# Patient Record
Sex: MALE | URBAN - METROPOLITAN AREA
[De-identification: names, ages, dates, MRNs, and addresses within clinical notes are randomized per-mention and may not be internally consistent; named-entity substitution may affect disease eponyms.]

---

## 2017-07-13 ENCOUNTER — INPATIENT (INPATIENT)
Facility: HOSPITAL | Age: 47
LOS: 4 days | Discharge: HOME | DRG: 897 | End: 2017-07-18
Attending: STUDENT IN AN ORGANIZED HEALTH CARE EDUCATION/TRAINING PROGRAM | Admitting: STUDENT IN AN ORGANIZED HEALTH CARE EDUCATION/TRAINING PROGRAM
Payer: MEDICAID

## 2017-07-13 VITALS
TEMPERATURE: 98 F | OXYGEN SATURATION: 96 % | DIASTOLIC BLOOD PRESSURE: 98 MMHG | RESPIRATION RATE: 18 BRPM | SYSTOLIC BLOOD PRESSURE: 135 MMHG | HEART RATE: 102 BPM

## 2017-07-13 LAB
ALBUMIN SERPL ELPH-MCNC: 4.7 G/DL — SIGNIFICANT CHANGE UP (ref 3.3–5)
ALP SERPL-CCNC: 67 U/L — SIGNIFICANT CHANGE UP (ref 40–120)
ALT FLD-CCNC: 131 U/L — HIGH (ref 10–45)
ANION GAP SERPL CALC-SCNC: 19 MMOL/L — HIGH (ref 5–17)
AST SERPL-CCNC: 213 U/L — HIGH (ref 10–40)
BILIRUB SERPL-MCNC: 1 MG/DL — SIGNIFICANT CHANGE UP (ref 0.2–1.2)
BUN SERPL-MCNC: 6 MG/DL — LOW (ref 7–23)
CALCIUM SERPL-MCNC: 9.1 MG/DL — SIGNIFICANT CHANGE UP (ref 8.4–10.5)
CHLORIDE SERPL-SCNC: 96 MMOL/L — SIGNIFICANT CHANGE UP (ref 96–108)
CO2 SERPL-SCNC: 22 MMOL/L — SIGNIFICANT CHANGE UP (ref 22–31)
CREAT SERPL-MCNC: 0.6 MG/DL — SIGNIFICANT CHANGE UP (ref 0.5–1.3)
GLUCOSE SERPL-MCNC: 132 MG/DL — HIGH (ref 70–99)
HCT VFR BLD CALC: 41.6 % — SIGNIFICANT CHANGE UP (ref 39–50)
HGB BLD-MCNC: 14.7 G/DL — SIGNIFICANT CHANGE UP (ref 13–17)
MAGNESIUM SERPL-MCNC: 1.9 MG/DL — SIGNIFICANT CHANGE UP (ref 1.6–2.6)
MCHC RBC-ENTMCNC: 32 PG — SIGNIFICANT CHANGE UP (ref 27–34)
MCHC RBC-ENTMCNC: 35.3 G/DL — SIGNIFICANT CHANGE UP (ref 32–36)
MCV RBC AUTO: 90.6 FL — SIGNIFICANT CHANGE UP (ref 80–100)
PLATELET # BLD AUTO: 150 K/UL — SIGNIFICANT CHANGE UP (ref 150–400)
POTASSIUM SERPL-MCNC: 4.4 MMOL/L — SIGNIFICANT CHANGE UP (ref 3.5–5.3)
POTASSIUM SERPL-SCNC: 4.4 MMOL/L — SIGNIFICANT CHANGE UP (ref 3.5–5.3)
PROT SERPL-MCNC: 8 G/DL — SIGNIFICANT CHANGE UP (ref 6–8.3)
RBC # BLD: 4.59 M/UL — SIGNIFICANT CHANGE UP (ref 4.2–5.8)
RBC # FLD: 13.5 % — SIGNIFICANT CHANGE UP (ref 10.3–16.9)
SODIUM SERPL-SCNC: 137 MMOL/L — SIGNIFICANT CHANGE UP (ref 135–145)
WBC # BLD: 4.8 K/UL — SIGNIFICANT CHANGE UP (ref 3.8–10.5)
WBC # FLD AUTO: 4.8 K/UL — SIGNIFICANT CHANGE UP (ref 3.8–10.5)

## 2017-07-13 PROCEDURE — 93010 ELECTROCARDIOGRAM REPORT: CPT

## 2017-07-13 PROCEDURE — 99284 EMERGENCY DEPT VISIT MOD MDM: CPT | Mod: 25

## 2017-07-13 RX ORDER — SODIUM CHLORIDE 9 MG/ML
1000 INJECTION INTRAMUSCULAR; INTRAVENOUS; SUBCUTANEOUS ONCE
Qty: 0 | Refills: 0 | Status: COMPLETED | OUTPATIENT
Start: 2017-07-13 | End: 2017-07-13

## 2017-07-13 RX ORDER — SODIUM CHLORIDE 9 MG/ML
1000 INJECTION, SOLUTION INTRAVENOUS
Qty: 0 | Refills: 0 | Status: COMPLETED | OUTPATIENT
Start: 2017-07-13 | End: 2017-07-14

## 2017-07-13 RX ORDER — ONDANSETRON 8 MG/1
4 TABLET, FILM COATED ORAL ONCE
Qty: 0 | Refills: 0 | Status: COMPLETED | OUTPATIENT
Start: 2017-07-13 | End: 2017-07-13

## 2017-07-13 RX ADMIN — ONDANSETRON 4 MILLIGRAM(S): 8 TABLET, FILM COATED ORAL at 22:47

## 2017-07-13 RX ADMIN — SODIUM CHLORIDE 2000 MILLILITER(S): 9 INJECTION INTRAMUSCULAR; INTRAVENOUS; SUBCUTANEOUS at 22:47

## 2017-07-13 RX ADMIN — SODIUM CHLORIDE 2000 MILLILITER(S): 9 INJECTION INTRAMUSCULAR; INTRAVENOUS; SUBCUTANEOUS at 23:38

## 2017-07-13 RX ADMIN — Medication 2 MILLIGRAM(S): at 23:43

## 2017-07-13 RX ADMIN — Medication 50 MILLIGRAM(S): at 23:43

## 2017-07-13 NOTE — ED PROVIDER NOTE - PROGRESS NOTE DETAILS
IVF running, pt shakiness improved. Lipase negative, s/p 1L bolus. Ordered for another   EKG NSR 91 1L bous running, pt shakiness improved.

## 2017-07-13 NOTE — ED ADULT TRIAGE NOTE - CHIEF COMPLAINT QUOTE
Pt BIBA from work place s/p possible syncope (unwittnessed) or possible fall.  Per EMS, pt drinks daily and doesn't remember what happened but his coworkers just heard a loud bang.  Pt denies Dizziness, Pain, N/V/D, SOB, Fevers at this time.

## 2017-07-13 NOTE — ED PROVIDER NOTE - PHYSICAL EXAMINATION
Constitutional: slightly shaky, WDWN resting comfortably in bed; NAD  Head: NC/AT  Eyes: +scleral injection, +pterygium, PERRL, EOMI  ENT: dry MM  Neck: supple  Respiratory: CTA B/L; no W/R/R, no retractions  Cardiac: +S1/S2; tachycardic, regular rhythm, no M/R/G  Gastrointestinal: soft, nontender, nondistended, no rebound or guarding; +BSx4  Extremities: WWP, no clubbing or cyanosis; no peripheral edema  Musculoskeletal: NROM x4; no joint swelling, tenderness or erythema  Vascular: 2+ radial, femoral, DP/PT pulses B/L  Dermatologic: skin warm, dry and intact  Neurologic: AAOx3; CNII-XII grossly intact; no focal deficits  Psychiatric: affect and characteristics of appearance, verbalizations, behaviors are appropriate Constitutional: tremulous; NAD  Head: NC/AT  Eyes: +scleral injection, +pterygium, PERRL, EOMI  ENT: dry MM, +tongue fasciculations  Neck: supple  Respiratory: CTA B/L; no W/R/R, no retractions  Cardiac: +S1/S2; RRR, no M/R/G  Gastrointestinal: soft, nontender, nondistended, no rebound or guarding; +BSx4  Extremities: WWP, no clubbing or cyanosis; no peripheral edema  Musculoskeletal: NROM x4; no joint swelling, tenderness or erythema  Vascular: 2+ radial, femoral, DP/PT pulses B/L  Dermatologic: skin warm, dry and intact  Neurologic: AAOx3; CNII-XII grossly intact; no focal deficits  Psychiatric: affect and characteristics of appearance, verbalizations, behaviors are appropriate

## 2017-07-13 NOTE — ED PROVIDER NOTE - ATTENDING CONTRIBUTION TO CARE
patient EtOH dependent  shaking and nauseous  Last drink yesterday  Vitals noted  Exam as documented, tremulous, diaphoretic  Received IVF, banana bag, ativan  Admitted for withdrawl

## 2017-07-13 NOTE — ED PROVIDER NOTE - MEDICAL DECISION MAKING DETAILS
Patient presented with nausea and tremors, . Improved after IVF  Awaiting EKG and lipase.   Likely d/c after fluids and labs Patient presented with nausea and tremors, . Improved after IVF. Lipase negative  EKG NSR 91   Likely d/c after 2nd bolus Patient presented with nausea and tremors, -->91 after 1L NS, Lipase negative. Pt still tremulous, given Banana bag, IV Ativan 2mg and Librium 50mg.   Will admit to F under SVC for alcohol withdrawal.  EKG NSR 91

## 2017-07-13 NOTE — ED PROVIDER NOTE - OBJECTIVE STATEMENT
47 Irish speaking male with daily alcohol use presenting after not feeling well at work. Patient is a  at [employer]. 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 He reports his "stomach is bothering him" and that this happens sometimes after drinking. He denies LOC, HA, dizziness, palpitations, chest pain, diaphoresis, diarrhea, fevers/chills.

## 2017-07-14 ENCOUNTER — TRANSCRIPTION ENCOUNTER (OUTPATIENT)
Age: 47
End: 2017-07-14

## 2017-07-14 DIAGNOSIS — F10.239 ALCOHOL DEPENDENCE WITH WITHDRAWAL, UNSPECIFIED: ICD-10-CM

## 2017-07-14 DIAGNOSIS — R63.8 OTHER SYMPTOMS AND SIGNS CONCERNING FOOD AND FLUID INTAKE: ICD-10-CM

## 2017-07-14 DIAGNOSIS — R94.5 ABNORMAL RESULTS OF LIVER FUNCTION STUDIES: ICD-10-CM

## 2017-07-14 DIAGNOSIS — Z29.9 ENCOUNTER FOR PROPHYLACTIC MEASURES, UNSPECIFIED: ICD-10-CM

## 2017-07-14 LAB
ALBUMIN SERPL ELPH-MCNC: 4.1 G/DL — SIGNIFICANT CHANGE UP (ref 3.3–5)
ALP SERPL-CCNC: 60 U/L — SIGNIFICANT CHANGE UP (ref 40–120)
ALT FLD-CCNC: 103 U/L — HIGH (ref 10–45)
AMPHET UR-MCNC: NEGATIVE — SIGNIFICANT CHANGE UP
ANION GAP SERPL CALC-SCNC: 13 MMOL/L — SIGNIFICANT CHANGE UP (ref 5–17)
AST SERPL-CCNC: 143 U/L — HIGH (ref 10–40)
BARBITURATES UR SCN-MCNC: NEGATIVE — SIGNIFICANT CHANGE UP
BASOPHILS NFR BLD AUTO: 1 % — SIGNIFICANT CHANGE UP (ref 0–2)
BASOPHILS NFR BLD AUTO: 2.1 % — HIGH (ref 0–2)
BENZODIAZ UR-MCNC: NEGATIVE — SIGNIFICANT CHANGE UP
BILIRUB SERPL-MCNC: 1.5 MG/DL — HIGH (ref 0.2–1.2)
BUN SERPL-MCNC: 5 MG/DL — LOW (ref 7–23)
CALCIUM SERPL-MCNC: 8.6 MG/DL — SIGNIFICANT CHANGE UP (ref 8.4–10.5)
CHLORIDE SERPL-SCNC: 98 MMOL/L — SIGNIFICANT CHANGE UP (ref 96–108)
CO2 SERPL-SCNC: 25 MMOL/L — SIGNIFICANT CHANGE UP (ref 22–31)
COCAINE METAB.OTHER UR-MCNC: NEGATIVE — SIGNIFICANT CHANGE UP
CREAT SERPL-MCNC: 0.6 MG/DL — SIGNIFICANT CHANGE UP (ref 0.5–1.3)
EOSINOPHIL NFR BLD AUTO: 2.1 % — SIGNIFICANT CHANGE UP (ref 0–6)
EOSINOPHIL NFR BLD AUTO: 4 % — SIGNIFICANT CHANGE UP (ref 0–6)
ETHANOL SERPL-MCNC: <10 MG/DL — SIGNIFICANT CHANGE UP (ref 0–10)
GLUCOSE SERPL-MCNC: 103 MG/DL — HIGH (ref 70–99)
HCT VFR BLD CALC: 40 % — SIGNIFICANT CHANGE UP (ref 39–50)
HGB BLD-MCNC: 14 G/DL — SIGNIFICANT CHANGE UP (ref 13–17)
LYMPHOCYTES # BLD AUTO: 12 % — LOW (ref 13–44)
LYMPHOCYTES # BLD AUTO: 15.6 % — SIGNIFICANT CHANGE UP (ref 13–44)
MAGNESIUM SERPL-MCNC: 1.8 MG/DL — SIGNIFICANT CHANGE UP (ref 1.6–2.6)
MCHC RBC-ENTMCNC: 32 PG — SIGNIFICANT CHANGE UP (ref 27–34)
MCHC RBC-ENTMCNC: 35 G/DL — SIGNIFICANT CHANGE UP (ref 32–36)
MCV RBC AUTO: 91.3 FL — SIGNIFICANT CHANGE UP (ref 80–100)
METHADONE UR-MCNC: NEGATIVE — SIGNIFICANT CHANGE UP
MONOCYTES NFR BLD AUTO: 14.3 % — HIGH (ref 2–14)
MONOCYTES NFR BLD AUTO: 14.9 % — HIGH (ref 2–14)
NEUTROPHILS NFR BLD AUTO: 64.5 % — SIGNIFICANT CHANGE UP (ref 43–77)
NEUTROPHILS NFR BLD AUTO: 69.5 % — SIGNIFICANT CHANGE UP (ref 43–77)
OPIATES UR-MCNC: NEGATIVE — SIGNIFICANT CHANGE UP
PCP SPEC-MCNC: SIGNIFICANT CHANGE UP
PCP SPEC-MCNC: SIGNIFICANT CHANGE UP
PCP UR-MCNC: NEGATIVE — SIGNIFICANT CHANGE UP
PHOSPHATE SERPL-MCNC: 3 MG/DL — SIGNIFICANT CHANGE UP (ref 2.5–4.5)
PLATELET # BLD AUTO: 144 K/UL — LOW (ref 150–400)
POTASSIUM SERPL-MCNC: 3.4 MMOL/L — LOW (ref 3.5–5.3)
POTASSIUM SERPL-SCNC: 3.4 MMOL/L — LOW (ref 3.5–5.3)
PROT SERPL-MCNC: 7.1 G/DL — SIGNIFICANT CHANGE UP (ref 6–8.3)
RBC # BLD: 4.38 M/UL — SIGNIFICANT CHANGE UP (ref 4.2–5.8)
RBC # FLD: 13.6 % — SIGNIFICANT CHANGE UP (ref 10.3–16.9)
SODIUM SERPL-SCNC: 136 MMOL/L — SIGNIFICANT CHANGE UP (ref 135–145)
THC UR QL: NEGATIVE — SIGNIFICANT CHANGE UP
WBC # BLD: 7 K/UL — SIGNIFICANT CHANGE UP (ref 3.8–10.5)
WBC # FLD AUTO: 7 K/UL — SIGNIFICANT CHANGE UP (ref 3.8–10.5)

## 2017-07-14 PROCEDURE — 99223 1ST HOSP IP/OBS HIGH 75: CPT

## 2017-07-14 PROCEDURE — 95819 EEG AWAKE AND ASLEEP: CPT | Mod: 26

## 2017-07-14 RX ORDER — POTASSIUM CHLORIDE 20 MEQ
20 PACKET (EA) ORAL ONCE
Qty: 0 | Refills: 0 | Status: COMPLETED | OUTPATIENT
Start: 2017-07-14 | End: 2017-07-14

## 2017-07-14 RX ORDER — POTASSIUM CHLORIDE 20 MEQ
40 PACKET (EA) ORAL ONCE
Qty: 0 | Refills: 0 | Status: DISCONTINUED | OUTPATIENT
Start: 2017-07-14 | End: 2017-07-14

## 2017-07-14 RX ORDER — MAGNESIUM SULFATE 500 MG/ML
2 VIAL (ML) INJECTION ONCE
Qty: 0 | Refills: 0 | Status: DISCONTINUED | OUTPATIENT
Start: 2017-07-14 | End: 2017-07-14

## 2017-07-14 RX ORDER — HEPARIN SODIUM 5000 [USP'U]/ML
5000 INJECTION INTRAVENOUS; SUBCUTANEOUS EVERY 8 HOURS
Qty: 0 | Refills: 0 | Status: DISCONTINUED | OUTPATIENT
Start: 2017-07-14 | End: 2017-07-18

## 2017-07-14 RX ORDER — MAGNESIUM SULFATE 500 MG/ML
2 VIAL (ML) INJECTION ONCE
Qty: 0 | Refills: 0 | Status: COMPLETED | OUTPATIENT
Start: 2017-07-14 | End: 2017-07-14

## 2017-07-14 RX ORDER — FOLIC ACID 0.8 MG
1 TABLET ORAL DAILY
Qty: 0 | Refills: 0 | Status: DISCONTINUED | OUTPATIENT
Start: 2017-07-14 | End: 2017-07-18

## 2017-07-14 RX ORDER — HEPARIN SODIUM 5000 [USP'U]/ML
5000 INJECTION INTRAVENOUS; SUBCUTANEOUS EVERY 8 HOURS
Qty: 0 | Refills: 0 | Status: DISCONTINUED | OUTPATIENT
Start: 2017-07-14 | End: 2017-07-14

## 2017-07-14 RX ORDER — THIAMINE MONONITRATE (VIT B1) 100 MG
100 TABLET ORAL DAILY
Qty: 0 | Refills: 0 | Status: DISCONTINUED | OUTPATIENT
Start: 2017-07-14 | End: 2017-07-18

## 2017-07-14 RX ORDER — POTASSIUM CHLORIDE 20 MEQ
40 PACKET (EA) ORAL ONCE
Qty: 0 | Refills: 0 | Status: COMPLETED | OUTPATIENT
Start: 2017-07-14 | End: 2017-07-14

## 2017-07-14 RX ADMIN — Medication 50 MILLIGRAM(S): at 16:58

## 2017-07-14 RX ADMIN — Medication 40 MILLIEQUIVALENT(S): at 08:55

## 2017-07-14 RX ADMIN — Medication 1 MILLIGRAM(S): at 09:23

## 2017-07-14 RX ADMIN — HEPARIN SODIUM 5000 UNIT(S): 5000 INJECTION INTRAVENOUS; SUBCUTANEOUS at 22:09

## 2017-07-14 RX ADMIN — Medication 1 TABLET(S): at 11:17

## 2017-07-14 RX ADMIN — HEPARIN SODIUM 5000 UNIT(S): 5000 INJECTION INTRAVENOUS; SUBCUTANEOUS at 14:00

## 2017-07-14 RX ADMIN — Medication 100 MILLIGRAM(S): at 11:17

## 2017-07-14 RX ADMIN — Medication 50 MILLIGRAM(S): at 06:43

## 2017-07-14 RX ADMIN — SODIUM CHLORIDE 150 MILLILITER(S): 9 INJECTION, SOLUTION INTRAVENOUS at 00:12

## 2017-07-14 RX ADMIN — HEPARIN SODIUM 5000 UNIT(S): 5000 INJECTION INTRAVENOUS; SUBCUTANEOUS at 06:43

## 2017-07-14 RX ADMIN — Medication 50 MILLIGRAM(S): at 11:17

## 2017-07-14 RX ADMIN — Medication 20 MILLIEQUIVALENT(S): at 13:22

## 2017-07-14 RX ADMIN — Medication 1 MILLIGRAM(S): at 11:17

## 2017-07-14 RX ADMIN — Medication 50 MILLIGRAM(S): at 22:05

## 2017-07-14 RX ADMIN — Medication 50 GRAM(S): at 08:56

## 2017-07-14 NOTE — PROGRESS NOTE ADULT - PROBLEM SELECTOR PLAN 1
Patient drinks 15 beers/day, last drink 7/12 at 10pm. Pt likely had seizure prior to admission with LOC and tongue biting. Currently patient is tremulous, reports formication and is lethargic.  CIWA 10.   - Continue Librium 50mg Q6hrs.  - Continue Ativan 2mg Q4hrs prn.  - Continue multivitamins, thiamine, and folic acid.  - B12/folic acid levels  - CIWA Q4hrs. Patient drinks 15 beers/day, last drink 7/12 at 10pm. Pt had LOC and tongue bleeding. Currently patient is tremulous, reports formication and is lethargic.  CIWA 10.   - Continue Librium 50mg Q6hrs.  - Continue Ativan 1mg Q4hrs prn.  - Continue multivitamins, thiamine, and folic acid.  - B12/folic acid levels  - CIWA Q4hrs. Patient drinks 15 beers/day, last drink 7/12 at 10pm. Pt had LOC and tongue bleeding. Currently patient is tremulous, reports formication and is lethargic.  CIWA 10.   - Continue Librium 50mg Q6hrs.  - 2 mg Ativan PO now  - Continue Ativan 1mg Q4hrs prn CIWA score  - Continue multivitamins, thiamine, and folic acid.  - B12/folic acid levels  - CIWA Q4hrs. Patient drinks 15 beers/day, last drink 7/12 at 10pm. Pt had LOC and tongue bleeding, PT likely had seizure. Currently patient is tremulous, reports formication and is lethargic.  CIWA 10.   - Continue Librium 50mg Q6hrs.  - 2 mg Ativan PO now  - Continue Ativan 1mg Q4hrs prn CIWA score >10  - EEG to evaluate alcohol withdrawal seizures  - Continue multivitamins, thiamine, and folic acid  - B12/folic acid levels  - CIWA Q4hrs.

## 2017-07-14 NOTE — PROGRESS NOTE ADULT - PROBLEM SELECTOR PLAN 4
F: continue with banana bag running at 150cc/hr  E: replete lytes PRN for K < 4 and Mg < 2, repleted K and Mg today will re eval in AM  N: regular diet F: patient does not IVF at this time  E: replete lytes PRN for K < 4 and Mg < 2, repleted K and Mg today will re eval in AM  N: regular diet

## 2017-07-14 NOTE — DISCHARGE NOTE ADULT - HOSPITAL COURSE
Pt is a 47 year old male with 5 year history of alcohol abuse who presented to ER from work following possible seizure. Pt stated that he felt dizzy, had tremors and experienced loss of conscience. On arrival to the ER patient was noted to have tongue lacerations with blood in the mouth. Pt on presentation had a CIWA score of 9. He was nauseous, diaphoretic, anxious, tremulous. Pt was placed on librium with PRN ativan based on his CIWA score. Abdominal ultrasound showed.....Hepatitis panel showed.....Pt currently feels greatly improved relative to his admission and denies nausea, vomiting, formication, A/V/T hallucinations. Pt is a 47 year old male with 5 year history of alcohol abuse who presented to ER from work following possible seizure. Pt stated that he felt dizzy, had tremors and experienced loss of conscience. On arrival to the ER patient was noted to have tongue lacerations with blood in the mouth. Pt on presentation had a CIWA score of 9. He was nauseous, diaphoretic, anxious, tremulous. Pt was placed on librium with PRN ativan based on his CIWA score. Abdominal ultrasound showed hepatitic steatosis. Hepatitis panel unremarkable. When we attempted to taper his librium pt became delirious, was found wandering the hallways and became anxious. Pt is a 47 year old male with 5 year history of alcohol abuse who presented to ER from work following possible seizure. Pt stated that he felt dizzy, had tremors and experienced loss of conscience. On arrival to the ER patient was noted to have tongue lacerations with blood in the mouth. Pt on presentation had a CIWA score of 9. He was nauseous, diaphoretic, anxious, tremulous. Pt was placed on librium with PRN ativan based on his CIWA score. Abdominal ultrasound showed hepatitic steatosis. Hepatitis panel unremarkable. When we initially attempted to taper his librium pt became delirious, was found wandering the hallways and became anxious. At discharge patient was asymptomatic. Pt is a 47 year old male with 5 year history of alcohol abuse who presented to ER from work following possible seizure. Pt stated that he felt dizzy, had tremors and experienced loss of conscience. On arrival to the ER patient was noted to have tongue lacerations with blood in the mouth. Pt on presentation had a CIWA score of 9. He was nauseous, diaphoretic, anxious, tremulous. Pt was placed on librium with PRN ativan based on his CIWA score. Abdominal ultrasound showed hepatitic steatosis. Hepatitis panel unremarkable. When we initially attempted to taper his librium pt became delirious, was found wandering the hallways and became anxious but this was likely due to benzodiazepine delirium as opposed to alcohol withdrawal. At discharge patient was asymptomatic.

## 2017-07-14 NOTE — H&P ADULT - NSHPSOCIALHISTORY_GEN_ALL_CORE
Patient drinks 15 beers per day for the past 5 years. Used to smoke 1 pack of cigarettes per day since age 13 until 2010, and since then he has been smoking 1-2 cigarettes a day. Does not use recreational drugs.

## 2017-07-14 NOTE — H&P ADULT - PROBLEM SELECTOR PLAN 2
Patient with transaminitis, with AST > ALT. Likely due to EtOH abuse.  - Continue to trend.  - Abdominal US for RUQ eval ordered, f/u

## 2017-07-14 NOTE — H&P ADULT - HISTORY OF PRESENT ILLNESS
46yo poor historian, Sinhala speaking male with PMH of EtOH abuse with ?history of seizures, and no intubation, presents BIBEMS after having tremors at work ([employer]). Patient says he usually drinks 15 beers/day for the past 5 yrs, and was in his usual state of health when he started his work shift at 5pm, and at 6pm his coworkers noticed he was shaking and decided to call EMS. Patient states he never loss consciousness or had seizures before being admitted. Of note, patient very vague in his story, initially said he has never been admitted to any hospital and that he has only has been treated in the ED at [facility], but further in the conversation, patient said he was admitted at [facility] (although no hx of admission on chart review noted) and treated for 2-3 days as inpatient- at that time he reported he was having seizure. At this moment, patient denies any nausea, vomiting, changes in bowel pattern, headache, visual/auditory/tactile hallucinations, or anxiety/agitation. Last drink was 7/12 at 10pm.     In the ED, patient's vital signs were:    BP: 135/98mmHg  HR: 102  RR: 18   O2Sat: 96% on RA   Temp: 97.5    Patient's labs were unremarkable except for elevated LFT's (AST > ALT).    In the ED, he also received librium 50mg x1, ativan 2mg x1, zofran 4mg x1, 1 banana bag, and 1L of NS x2.

## 2017-07-14 NOTE — DISCHARGE NOTE ADULT - PATIENT PORTAL LINK FT
“You can access the Follow[facility] Patient Portal, offered by [facility], by registering with the following website: [email or web address]”

## 2017-07-14 NOTE — PROGRESS NOTE ADULT - ASSESSMENT
48yo poor historian, Lithuanian speaking male with PMH of EtOH abuse - currently drinking approx 10 drinks/day - with a history of seizures who presented to the ER after probable seizure at work. Patient had LOC, tongue biting and had resting tremors at work. Currently patient remains lethargic, has tremors of the extremities and fasciculations of the tongue. Pt reports formication. 48yo poor historian, Hungarian speaking male with PMH of EtOH abuse - currently drinking approx 10 drinks/day - with a history of seizures who presented to the ER after probable seizure at work. Patient had LOC, tongue biting and had resting tremors at work. Currently patient remains lethargic, has tremors of the extremities and fasciculations of the tongue. Pt reports formication. CIWA 10.

## 2017-07-14 NOTE — H&P ADULT - ATTENDING COMMENTS
Pt seen and examined by me at bedside earlier in AM. Agree with housestaff's exam/a/p as noted.  [id number] used, report hx of etoh w/d seizure, last 6month ago. last drink on wed around 11pm, usually drinks 5-20 beer per day. VSS, +tremulous, R tip of tongue laceration 2/2 ? seizure.   labs reviewed.   a/p:  1. ETOH w/d: c/w librium 50mg q6hrs, frequent CIWA; ativan prn, EEG to r/o seizure.   2. Abnormal LFT likely 2/2 ETOH abuse: check hepatitis panel; check RUQ u/s  3. Thrombocytopenia likely 2/2 ETOH  rest of a/p as above  Dispo: anticipate dc in 1-2 days.

## 2017-07-14 NOTE — H&P ADULT - ASSESSMENT
48yo poor historian, Turkmen speaking male with PMH of EtOH abuse with ?history of seizures, and no intubation, presents BIBEMS after having tremors at work admitted for alcohol withdrawal.

## 2017-07-14 NOTE — H&P ADULT - NSHPLABSRESULTS_GEN_ALL_CORE
LABS:                         14.7   4.8   )-----------( 150      ( 13 Jul 2017 22:56 )             41.6     07-13    137  |  96  |  6<L>  ----------------------------<  132<H>  4.4   |  22  |  0.60    Ca    9.1      13 Jul 2017 22:56  Mg     1.9     07-13    TPro  8.0  /  Alb  4.7  /  TBili  1.0  /  DBili  x   /  AST  213<H>  /  ALT  131<H>  /  AlkPhos  67  07-13    RADIOLOGY, EKG & ADDITIONAL TESTS: Reviewed.

## 2017-07-14 NOTE — PROGRESS NOTE ADULT - PROBLEM SELECTOR PLAN 2
Patient with transaminitis, with AST > ALT. Likely due to EtOH abuse.  - Continue to trend.  - Abdominal US for RUQ eval ordered, f/u Patient with transaminitis, with AST > ALT. Likely due to EtOH abuse.  - Continue to trend.  - hepatitis panels to assess for co morbid hepatitis  - Abdominal US for RUQ eval ordered, f/u

## 2017-07-14 NOTE — DISCHARGE NOTE ADULT - MEDICATION SUMMARY - MEDICATIONS TO TAKE
I will START or STAY ON the medications listed below when I get home from the hospital:    Multiple Vitamins oral tablet  -- 1 tab(s) by mouth once a day  -- Indication: For Nutrition, metabolism, and development symptoms    thiamine 100 mg oral tablet  -- 1 tab(s) by mouth once a day  -- Indication: For Nutrition, metabolism, and development symptoms    folic acid 1 mg oral tablet  -- 1 tab(s) by mouth once a day  -- Indication: For Nutrition, metabolism, and development symptoms

## 2017-07-14 NOTE — H&P ADULT - PROBLEM SELECTOR PLAN 1
Patient drinks 15 beers/day, last drink 7/12 at 10pm. Possible history of seizures, no intubation. VSS. CIWA of 9 on admission for tremors and diaphoresis.   - Continue Librium 50mg Q6hrs.  - Continue Ativan 2mg Q4hrs prn.  - Continue multivitamins, thiamine, and folic acid.  - CIWA Q4hrs.

## 2017-07-14 NOTE — DISCHARGE NOTE ADULT - CARE PLAN
Principal Discharge DX:	Alcohol withdrawal syndrome, with unspecified complication  Goal:	Decrease drinking  Instructions for follow-up, activity and diet:	We determined that you were likely brought to the hospital because you drink too much alcohol too frequently. You should cut back on your drinking. You should schedule an appointment with a doctor to discuss your drinking and strategies to cut back on your drinking.  Secondary Diagnosis:	Elevated LFTs  Instructions for follow-up, activity and diet:	You were found to have elevated enzymes which indicate your liver was damaged. This could be due to your alcohol use or may be a symptom of another medical problem. Please follow up with a doctor to determine if this issue has resolved and the cause.  Secondary Diagnosis:	Nutrition, metabolism, and development symptoms  Instructions for follow-up, activity and diet:	You were found to be deficient in a number of vitamins most likely due to your poor diet and alcohol use. Please eat a balanced diet and cut back on your drinking. Principal Discharge DX:	Alcohol withdrawal syndrome, with unspecified complication  Goal:	Decrease drinking  Instructions for follow-up, activity and diet:	We determined that you were likely brought to the hospital because you drink too much alcohol too frequently. You should cut back on your drinking. You should schedule an appointment with a doctor to discuss your drinking and strategies to cut back on your drinking.  Secondary Diagnosis:	Elevated LFTs  Instructions for follow-up, activity and diet:	You were found to have elevated enzymes which indicate your liver was damaged. We performed an ultrasound of your liver while you were in the hospital and it is likely that your liver damage is due to your drinking. You should reduce the amount you drink in order to help fix the damage to your liver.  Secondary Diagnosis:	Nutrition, metabolism, and development symptoms  Instructions for follow-up, activity and diet:	You were found to be deficient in a number of vitamins most likely due to your poor diet and alcohol use. Please eat a balanced diet and cut back on your drinking. Principal Discharge DX:	Alcohol withdrawal syndrome, with unspecified complication  Goal:	Decrease drinking  Instructions for follow-up, activity and diet:	We determined that you were likely brought to the hospital because you drink too much alcohol too frequently. You should cut back on your drinking. You should schedule an appointment with a doctor to discuss your drinking and strategies to cut back on your drinking.  Secondary Diagnosis:	Elevated LFTs  Instructions for follow-up, activity and diet:	You were found to have elevated enzymes which indicate your liver was damaged. We performed an ultrasound of your liver while you were in the hospital and it is likely that your liver damage is due to your drinking. You should reduce the amount you drink in order to help fix the damage to your liver. Please follow up with your primary doctor to follow your liver damage.  Secondary Diagnosis:	Nutrition, metabolism, and development symptoms  Instructions for follow-up, activity and diet:	You were found to be deficient in a number of vitamins most likely due to your poor diet and alcohol use. Please eat a balanced diet and cut back on your drinking. Take the vitamins that we have ordered for you as ordered. Please follow up with your primary care doctor.

## 2017-07-14 NOTE — H&P ADULT - NSHPPHYSICALEXAM_GEN_ALL_CORE
VITAL SIGNS:  T(C): 36.8 (07-14-17 @ 01:23), Max: 37.2 (07-13-17 @ 23:53)  T(F): 98.3 (07-14-17 @ 01:23), Max: 99 (07-13-17 @ 23:53)  HR: 83 (07-14-17 @ 01:23) (83 - 102)  BP: 146/91 (07-14-17 @ 01:23) (135/98 - 146/91)  BP(mean): --  RR: 18 (07-14-17 @ 01:23) (18 - 18)  SpO2: 97% (07-14-17 @ 01:23) (96% - 97%)  Wt(kg): --    PHYSICAL EXAM:    Constitutional: patient looks anxious, however, in no acute distress.  Head: NC/AT  Eyes: EOMI  ENT: MMM, streaks of blood noticed in mouth, however no gross trauma noticed  Respiratory: CTA bilaterally  Cardiac: RRR, no M/R/G appreciated  Gastrointestinal: normoactive bowel sounds, abdomen soft, NTND  Extremities: WWP, no peripheral edema  Musculoskeletal: no joint swelling  Vascular: 2+ radial, and DP pulses B/L  Neurologic: AAOx3, no focal deficits

## 2017-07-14 NOTE — H&P ADULT - PROBLEM SELECTOR PLAN 4
F: continue with banana bag running at 150cc/hr  E: replete lytes PRN for K < 4 and Mg < 2  N: regular diet

## 2017-07-14 NOTE — DISCHARGE NOTE ADULT - PLAN OF CARE
Decrease drinking We determined that you were likely brought to the hospital because you drink too much alcohol too frequently. You should cut back on your drinking. You should schedule an appointment with a doctor to discuss your drinking and strategies to cut back on your drinking. You were found to have elevated enzymes which indicate your liver was damaged. This could be due to your alcohol use or may be a symptom of another medical problem. Please follow up with a doctor to determine if this issue has resolved and the cause. You were found to be deficient in a number of vitamins most likely due to your poor diet and alcohol use. Please eat a balanced diet and cut back on your drinking. You were found to have elevated enzymes which indicate your liver was damaged. We performed an ultrasound of your liver while you were in the hospital and it is likely that your liver damage is due to your drinking. You should reduce the amount you drink in order to help fix the damage to your liver. You were found to be deficient in a number of vitamins most likely due to your poor diet and alcohol use. Please eat a balanced diet and cut back on your drinking. Take the vitamins that we have ordered for you as ordered. Please follow up with your primary care doctor. You were found to have elevated enzymes which indicate your liver was damaged. We performed an ultrasound of your liver while you were in the hospital and it is likely that your liver damage is due to your drinking. You should reduce the amount you drink in order to help fix the damage to your liver. Please follow up with your primary doctor to follow your liver damage.

## 2017-07-15 DIAGNOSIS — E87.1 HYPO-OSMOLALITY AND HYPONATREMIA: ICD-10-CM

## 2017-07-15 LAB
ALBUMIN SERPL ELPH-MCNC: 4.3 G/DL — SIGNIFICANT CHANGE UP (ref 3.3–5)
ALP SERPL-CCNC: 71 U/L — SIGNIFICANT CHANGE UP (ref 40–120)
ALT FLD-CCNC: 118 U/L — HIGH (ref 10–45)
ANION GAP SERPL CALC-SCNC: 13 MMOL/L — SIGNIFICANT CHANGE UP (ref 5–17)
AST SERPL-CCNC: 153 U/L — HIGH (ref 10–40)
BILIRUB SERPL-MCNC: 1.4 MG/DL — HIGH (ref 0.2–1.2)
BUN SERPL-MCNC: 7 MG/DL — SIGNIFICANT CHANGE UP (ref 7–23)
CALCIUM SERPL-MCNC: 9 MG/DL — SIGNIFICANT CHANGE UP (ref 8.4–10.5)
CHLORIDE SERPL-SCNC: 97 MMOL/L — SIGNIFICANT CHANGE UP (ref 96–108)
CO2 SERPL-SCNC: 24 MMOL/L — SIGNIFICANT CHANGE UP (ref 22–31)
CREAT SERPL-MCNC: 0.6 MG/DL — SIGNIFICANT CHANGE UP (ref 0.5–1.3)
FOLATE SERPL-MCNC: 14.2 NG/ML — SIGNIFICANT CHANGE UP (ref 4.8–24.2)
GLUCOSE SERPL-MCNC: 110 MG/DL — HIGH (ref 70–99)
HAV IGM SER-ACNC: SIGNIFICANT CHANGE UP
HBV CORE IGM SER-ACNC: SIGNIFICANT CHANGE UP
HBV SURFACE AG SER-ACNC: SIGNIFICANT CHANGE UP
HCT VFR BLD CALC: 43.9 % — SIGNIFICANT CHANGE UP (ref 39–50)
HCV AB S/CO SERPL IA: 0.12 S/CO — SIGNIFICANT CHANGE UP
HCV AB SERPL-IMP: SIGNIFICANT CHANGE UP
HGB BLD-MCNC: 15.1 G/DL — SIGNIFICANT CHANGE UP (ref 13–17)
MCHC RBC-ENTMCNC: 31.5 PG — SIGNIFICANT CHANGE UP (ref 27–34)
MCHC RBC-ENTMCNC: 34.4 G/DL — SIGNIFICANT CHANGE UP (ref 32–36)
MCV RBC AUTO: 91.6 FL — SIGNIFICANT CHANGE UP (ref 80–100)
PLATELET # BLD AUTO: 153 K/UL — SIGNIFICANT CHANGE UP (ref 150–400)
POTASSIUM SERPL-MCNC: 3.9 MMOL/L — SIGNIFICANT CHANGE UP (ref 3.5–5.3)
POTASSIUM SERPL-SCNC: 3.9 MMOL/L — SIGNIFICANT CHANGE UP (ref 3.5–5.3)
PROT SERPL-MCNC: 8.1 G/DL — SIGNIFICANT CHANGE UP (ref 6–8.3)
RBC # BLD: 4.79 M/UL — SIGNIFICANT CHANGE UP (ref 4.2–5.8)
RBC # FLD: 13.7 % — SIGNIFICANT CHANGE UP (ref 10.3–16.9)
SODIUM SERPL-SCNC: 134 MMOL/L — LOW (ref 135–145)
VIT B12 SERPL-MCNC: 441 PG/ML — SIGNIFICANT CHANGE UP (ref 243–894)
WBC # BLD: 5.5 K/UL — SIGNIFICANT CHANGE UP (ref 3.8–10.5)
WBC # FLD AUTO: 5.5 K/UL — SIGNIFICANT CHANGE UP (ref 3.8–10.5)

## 2017-07-15 PROCEDURE — 99233 SBSQ HOSP IP/OBS HIGH 50: CPT

## 2017-07-15 PROCEDURE — 76705 ECHO EXAM OF ABDOMEN: CPT | Mod: 26

## 2017-07-15 RX ADMIN — Medication 50 MILLIGRAM(S): at 06:10

## 2017-07-15 RX ADMIN — Medication 1 MILLIGRAM(S): at 11:05

## 2017-07-15 RX ADMIN — HEPARIN SODIUM 5000 UNIT(S): 5000 INJECTION INTRAVENOUS; SUBCUTANEOUS at 06:10

## 2017-07-15 RX ADMIN — HEPARIN SODIUM 5000 UNIT(S): 5000 INJECTION INTRAVENOUS; SUBCUTANEOUS at 13:21

## 2017-07-15 RX ADMIN — Medication 100 MILLIGRAM(S): at 11:05

## 2017-07-15 RX ADMIN — HEPARIN SODIUM 5000 UNIT(S): 5000 INJECTION INTRAVENOUS; SUBCUTANEOUS at 21:26

## 2017-07-15 RX ADMIN — Medication 25 MILLIGRAM(S): at 19:51

## 2017-07-15 RX ADMIN — Medication 1 TABLET(S): at 11:05

## 2017-07-15 RX ADMIN — Medication 2 MILLIGRAM(S): at 14:51

## 2017-07-15 RX ADMIN — Medication 25 MILLIGRAM(S): at 21:26

## 2017-07-15 RX ADMIN — Medication 25 MILLIGRAM(S): at 13:21

## 2017-07-15 RX ADMIN — Medication 1 MILLIGRAM(S): at 22:27

## 2017-07-15 NOTE — PROGRESS NOTE ADULT - ASSESSMENT
48yo poor historian, Kyrgyz speaking male with PMH of EtOH abuse - currently drinking approx 10 drinks/day - with a history of seizures who presented to the ER after probable seizure at work. Patient had LOC, tongue biting and had resting tremors at work. Currently patient remains lethargic, has tremors of the extremities and fasciculations of the tongue. Pt reports formication. CIWA 10.

## 2017-07-15 NOTE — CHART NOTE - NSCHARTNOTEFT_GEN_A_CORE
Called by RN for patient with confusion. RN states that patient was attempting to get into shower while wearing clothes and insisting that he needs to go outside. Patient seen and examined at bedside using . Patient states that he has not showered in days since he was brought here from work. He states that he has told the staff that he wanted to shower but has not been allowed to. He does not fully understand the events that brought him to the hospital. Upon my arrival at the bedside, patient was sleeping soundly. There no diaphoresis and only fine tremor is present with arms fully extended. Patient denies headache, nausea, tactile/visual/auditory hallucinations. CIWA 1-2 for tremor. Received Librium 25mg po and Ativan 2mg IV 2 hours prior.    A/P:  Patient is comfortable, somnolent and denies hallucinations. CIWA 2. I suspect that the patient's confusion represents a mild delirium 2/2 medications. There is no evidence of Delirium Tremens at this time. Avoid additional benzos except scheduled Librium taper. No further IV Ativan. Can give Ativan po prn for withdrawal symptoms for CIWA > 8.

## 2017-07-16 LAB
ANION GAP SERPL CALC-SCNC: 16 MMOL/L — SIGNIFICANT CHANGE UP (ref 5–17)
BUN SERPL-MCNC: 7 MG/DL — SIGNIFICANT CHANGE UP (ref 7–23)
CALCIUM SERPL-MCNC: 9.5 MG/DL — SIGNIFICANT CHANGE UP (ref 8.4–10.5)
CHLORIDE SERPL-SCNC: 95 MMOL/L — LOW (ref 96–108)
CO2 SERPL-SCNC: 25 MMOL/L — SIGNIFICANT CHANGE UP (ref 22–31)
CREAT SERPL-MCNC: 0.7 MG/DL — SIGNIFICANT CHANGE UP (ref 0.5–1.3)
GLUCOSE SERPL-MCNC: 118 MG/DL — HIGH (ref 70–99)
HCT VFR BLD CALC: 41.5 % — SIGNIFICANT CHANGE UP (ref 39–50)
HGB BLD-MCNC: 14.3 G/DL — SIGNIFICANT CHANGE UP (ref 13–17)
MAGNESIUM SERPL-MCNC: 1.9 MG/DL — SIGNIFICANT CHANGE UP (ref 1.6–2.6)
MCHC RBC-ENTMCNC: 31.9 PG — SIGNIFICANT CHANGE UP (ref 27–34)
MCHC RBC-ENTMCNC: 34.5 G/DL — SIGNIFICANT CHANGE UP (ref 32–36)
MCV RBC AUTO: 92.6 FL — SIGNIFICANT CHANGE UP (ref 80–100)
PLATELET # BLD AUTO: 160 K/UL — SIGNIFICANT CHANGE UP (ref 150–400)
POTASSIUM SERPL-MCNC: 3.5 MMOL/L — SIGNIFICANT CHANGE UP (ref 3.5–5.3)
POTASSIUM SERPL-SCNC: 3.5 MMOL/L — SIGNIFICANT CHANGE UP (ref 3.5–5.3)
RBC # BLD: 4.48 M/UL — SIGNIFICANT CHANGE UP (ref 4.2–5.8)
RBC # FLD: 13.8 % — SIGNIFICANT CHANGE UP (ref 10.3–16.9)
SODIUM SERPL-SCNC: 136 MMOL/L — SIGNIFICANT CHANGE UP (ref 135–145)
WBC # BLD: 6.2 K/UL — SIGNIFICANT CHANGE UP (ref 3.8–10.5)
WBC # FLD AUTO: 6.2 K/UL — SIGNIFICANT CHANGE UP (ref 3.8–10.5)

## 2017-07-16 PROCEDURE — 99233 SBSQ HOSP IP/OBS HIGH 50: CPT

## 2017-07-16 RX ORDER — HALOPERIDOL DECANOATE 100 MG/ML
1 INJECTION INTRAMUSCULAR ONCE
Qty: 0 | Refills: 0 | Status: DISCONTINUED | OUTPATIENT
Start: 2017-07-16 | End: 2017-07-16

## 2017-07-16 RX ORDER — MAGNESIUM SULFATE 500 MG/ML
1 VIAL (ML) INJECTION ONCE
Qty: 0 | Refills: 0 | Status: COMPLETED | OUTPATIENT
Start: 2017-07-16 | End: 2017-07-16

## 2017-07-16 RX ORDER — POTASSIUM CHLORIDE 20 MEQ
40 PACKET (EA) ORAL ONCE
Qty: 0 | Refills: 0 | Status: COMPLETED | OUTPATIENT
Start: 2017-07-16 | End: 2017-07-16

## 2017-07-16 RX ADMIN — Medication 2 MILLIGRAM(S): at 23:00

## 2017-07-16 RX ADMIN — Medication 25 MILLIGRAM(S): at 08:26

## 2017-07-16 RX ADMIN — Medication 2 MILLIGRAM(S): at 00:31

## 2017-07-16 RX ADMIN — Medication 50 MILLIGRAM(S): at 13:24

## 2017-07-16 RX ADMIN — Medication 100 MILLIGRAM(S): at 11:22

## 2017-07-16 RX ADMIN — Medication 100 GRAM(S): at 09:59

## 2017-07-16 RX ADMIN — HEPARIN SODIUM 5000 UNIT(S): 5000 INJECTION INTRAVENOUS; SUBCUTANEOUS at 13:24

## 2017-07-16 RX ADMIN — Medication 1 MILLIGRAM(S): at 11:22

## 2017-07-16 RX ADMIN — Medication 2 MILLIGRAM(S): at 16:17

## 2017-07-16 RX ADMIN — Medication 2 MILLIGRAM(S): at 21:40

## 2017-07-16 RX ADMIN — Medication 3 MILLIGRAM(S): at 03:10

## 2017-07-16 RX ADMIN — HEPARIN SODIUM 5000 UNIT(S): 5000 INJECTION INTRAVENOUS; SUBCUTANEOUS at 21:31

## 2017-07-16 RX ADMIN — Medication 1 TABLET(S): at 11:22

## 2017-07-16 RX ADMIN — Medication 40 MILLIEQUIVALENT(S): at 08:26

## 2017-07-16 RX ADMIN — Medication 50 MILLIGRAM(S): at 18:02

## 2017-07-16 RX ADMIN — Medication 25 MILLIGRAM(S): at 07:12

## 2017-07-16 RX ADMIN — HEPARIN SODIUM 5000 UNIT(S): 5000 INJECTION INTRAVENOUS; SUBCUTANEOUS at 07:12

## 2017-07-16 RX ADMIN — Medication 2 MILLIGRAM(S): at 02:00

## 2017-07-16 NOTE — PROGRESS NOTE ADULT - PROBLEM SELECTOR PLAN 2
Patient with transaminitis, with AST > ALT. Likely due to EtOH abuse.  - Continue to trend.  - hepatitis panels to assess for co morbid hepatitis  - Abdominal US for RUQ eval ordered, f/u Patient with transaminitis, with AST > ALT. Likely due to EtOH abuse.  - Continue to trend.  - hepatitis panels to assess for co morbid hepatitis  - Abdominal US for RUQ s/f hepatic steatosis

## 2017-07-16 NOTE — CHART NOTE - NSCHARTNOTEFT_GEN_A_CORE
10pm CIWA 12, 1mg IV ativan given. Called to bedside at 1am due to patient agitation. Patient was repeatedly getting out of bed, was disoriented to place and situation, and was restless. 2mg IV ativan given at 1am, patient continued to have increasing itchiness, described feeling bugs crawling on his skin, and displayed behaviors consistent with visual hallucination (pinching at bedsheets on the bottom of his bed, looking repeatedly at the curtain and tying it into knots). Patient became more tachycardic to 105-110s. 2mg IV ativan given at 2am. Patient continued to remain agitated and itchy, continued to try to climb out of bed. Patient given 3mg IM ativan (due to loss of IV access) at 3:15am, appeared to be more comfortable, slept approximately 20 minutes, then became more restless at 3:40am. Vitals obtained at 3:40 included HR in 80s, blood pressures 130s/80s, O2 sat 100% RA, RR 15-18 counted over 20 seconds. 10pm CIWA 12, 1mg IV ativan given. Called to bedside at 1am due to patient agitation. Patient was repeatedly getting out of bed, was disoriented to place and situation, and was restless. 2mg IV ativan given at 1am, patient continued to have increasing itchiness, described feeling bugs crawling on his skin, and displayed behaviors consistent with visual hallucination (pinching at bedsheets on the bottom of his bed, looking repeatedly at the curtain and tying it into knots). Patient became more tachycardic to 105-110s. 2mg IV ativan given at 2am. Patient continued to remain agitated and itchy, continued to try to climb out of bed. Patient given 3mg IM ativan (due to loss of IV access) at 3:15am, appeared to be more comfortable, slept approximately 20 minutes, then became more restless at 3:40am. Vitals obtained at 3:40 included HR in 80s, blood pressures 130s/80s, O2 sat 100% RA, RR 15-18 counted over 20 seconds. Reassessed q30 minutes, 5:00am HR 67, O2 sat 100% RA, RR 14 (counted 30 seconds)

## 2017-07-16 NOTE — PROGRESS NOTE ADULT - PROBLEM SELECTOR PLAN 1
Patient drinks 15 beers/day, last drink 7/12 at 10pm. Pt had LOC and tongue bleeding, PT likely had seizure. Currently patient is tremulous, reports formication and is lethargic.  CIWA 10.   - Continue Librium 50mg Q6hrs.  - 2 mg Ativan PO now  - Continue Ativan 1mg Q4hrs prn CIWA score >10  - EEG to evaluate alcohol withdrawal seizures  - Continue multivitamins, thiamine, and folic acid  - B12/folic acid levels  - CIWA Q4hrs.

## 2017-07-16 NOTE — PROGRESS NOTE ADULT - PROBLEM SELECTOR PLAN 4
F: patient does not IVF at this time  E: replete lytes PRN for K < 4 and Mg < 2, repleted K and Mg today will re eval in AM  N: regular diet [facility]    DISPO: Continue care on RMF  CODE: FULL

## 2017-07-16 NOTE — PROGRESS NOTE ADULT - PROBLEM SELECTOR PLAN 3
Heparin SQ F: patient does not IVF at this time  E: replete lytes PRN for K < 4 and Mg < 2, repleted K and Mg today will re eval in AM  N: regular diet

## 2017-07-16 NOTE — PROGRESS NOTE ADULT - ASSESSMENT
46yo poor historian, Slovenian speaking male with PMH of EtOH abuse - currently drinking approx 10 drinks/day - with a history of seizures who presented to the ER after probable seizure at work. Patient had LOC, tongue biting and had resting tremors at work. Currently patient remains lethargic, has tremors of the extremities and fasciculations of the tongue. Pt reports formication. CIWA 12-15 overnight. Pt given 8mg Ativan total overnight, and Librium taper restarted this morning. 48 y/o poor historian, Slovak speaking male with PMH of EtOH abuse - currently drinking approx 10 drinks/day - with a history of seizures who presented to the ER after probable seizure at work. Patient had LOC, tongue biting and had resting tremors at work. Currently patient remains lethargic, has tremors of the extremities and fasciculations of the tongue. Pt reports formication. CIWA 12-15 overnight. Pt given 8mg Ativan total overnight, and Librium taper restarted this morning.

## 2017-07-17 LAB
ANION GAP SERPL CALC-SCNC: 15 MMOL/L — SIGNIFICANT CHANGE UP (ref 5–17)
BUN SERPL-MCNC: 8 MG/DL — SIGNIFICANT CHANGE UP (ref 7–23)
CALCIUM SERPL-MCNC: 9.6 MG/DL — SIGNIFICANT CHANGE UP (ref 8.4–10.5)
CHLORIDE SERPL-SCNC: 98 MMOL/L — SIGNIFICANT CHANGE UP (ref 96–108)
CO2 SERPL-SCNC: 26 MMOL/L — SIGNIFICANT CHANGE UP (ref 22–31)
CREAT SERPL-MCNC: 0.8 MG/DL — SIGNIFICANT CHANGE UP (ref 0.5–1.3)
GLUCOSE SERPL-MCNC: 116 MG/DL — HIGH (ref 70–99)
HCT VFR BLD CALC: 41.1 % — SIGNIFICANT CHANGE UP (ref 39–50)
HGB BLD-MCNC: 14.4 G/DL — SIGNIFICANT CHANGE UP (ref 13–17)
MAGNESIUM SERPL-MCNC: 1.9 MG/DL — SIGNIFICANT CHANGE UP (ref 1.6–2.6)
MCHC RBC-ENTMCNC: 32.4 PG — SIGNIFICANT CHANGE UP (ref 27–34)
MCHC RBC-ENTMCNC: 35 G/DL — SIGNIFICANT CHANGE UP (ref 32–36)
MCV RBC AUTO: 92.6 FL — SIGNIFICANT CHANGE UP (ref 80–100)
PLATELET # BLD AUTO: 168 K/UL — SIGNIFICANT CHANGE UP (ref 150–400)
POTASSIUM SERPL-MCNC: 3.9 MMOL/L — SIGNIFICANT CHANGE UP (ref 3.5–5.3)
POTASSIUM SERPL-SCNC: 3.9 MMOL/L — SIGNIFICANT CHANGE UP (ref 3.5–5.3)
RBC # BLD: 4.44 M/UL — SIGNIFICANT CHANGE UP (ref 4.2–5.8)
RBC # FLD: 13.9 % — SIGNIFICANT CHANGE UP (ref 10.3–16.9)
SODIUM SERPL-SCNC: 139 MMOL/L — SIGNIFICANT CHANGE UP (ref 135–145)
WBC # BLD: 6.4 K/UL — SIGNIFICANT CHANGE UP (ref 3.8–10.5)
WBC # FLD AUTO: 6.4 K/UL — SIGNIFICANT CHANGE UP (ref 3.8–10.5)

## 2017-07-17 PROCEDURE — 99233 SBSQ HOSP IP/OBS HIGH 50: CPT

## 2017-07-17 RX ORDER — MAGNESIUM SULFATE 500 MG/ML
1 VIAL (ML) INJECTION ONCE
Qty: 0 | Refills: 0 | Status: COMPLETED | OUTPATIENT
Start: 2017-07-17 | End: 2017-07-17

## 2017-07-17 RX ORDER — POTASSIUM CHLORIDE 20 MEQ
10 PACKET (EA) ORAL ONCE
Qty: 0 | Refills: 0 | Status: COMPLETED | OUTPATIENT
Start: 2017-07-17 | End: 2017-07-17

## 2017-07-17 RX ADMIN — Medication 2 MILLIGRAM(S): at 02:21

## 2017-07-17 RX ADMIN — Medication 1 MILLIGRAM(S): at 12:04

## 2017-07-17 RX ADMIN — HEPARIN SODIUM 5000 UNIT(S): 5000 INJECTION INTRAVENOUS; SUBCUTANEOUS at 08:19

## 2017-07-17 RX ADMIN — Medication 100 GRAM(S): at 12:05

## 2017-07-17 RX ADMIN — Medication 25 MILLIGRAM(S): at 15:48

## 2017-07-17 RX ADMIN — HEPARIN SODIUM 5000 UNIT(S): 5000 INJECTION INTRAVENOUS; SUBCUTANEOUS at 14:55

## 2017-07-17 RX ADMIN — Medication 1 TABLET(S): at 12:04

## 2017-07-17 RX ADMIN — Medication 10 MILLIEQUIVALENT(S): at 12:04

## 2017-07-17 RX ADMIN — Medication 50 MILLIGRAM(S): at 00:19

## 2017-07-17 RX ADMIN — Medication 100 MILLIGRAM(S): at 12:04

## 2017-07-17 RX ADMIN — Medication 25 MILLIGRAM(S): at 23:03

## 2017-07-17 RX ADMIN — Medication 50 MILLIGRAM(S): at 08:19

## 2017-07-17 RX ADMIN — HEPARIN SODIUM 5000 UNIT(S): 5000 INJECTION INTRAVENOUS; SUBCUTANEOUS at 23:03

## 2017-07-17 NOTE — PROGRESS NOTE ADULT - SUBJECTIVE AND OBJECTIVE BOX
Pt seen and examined by me at bedside earlier in AM   used [id number], pt states feeling better today    VSS  noted decrease tremulous  no tongue fasciculation  comfortable, NAD    labs reviewed.
INTERVAL HPI/OVERNIGHT EVENTS:    Pt is 46 yo Italian speaking male with hx of alcohol abuse who presented to the ER for tremulousness, AMS and nausea. Pt drinks approximately 10 beers/day for the last 5 years and his last drink was Wednesday (6/12) night when he drank to intoxication. Pt states the went to work and at approximately 10 PM began to experience dizziness, nausea and tremors in his hand at which point he states that he had LOC until arrival in the ER. Pt states that he had a prior admission to [facility] two years ago for seizures related to his alcohol use but does not recall any details - he has no documents in the EMR pertaining to this visit. He has no childhood hx of seizures. Pt currently reports feeling tremors and shaking in his hands accompanied by mild formication. He denies any N/V, headache, sweating, AV hallucinations, anxiety, agitation, fever/chills, SOB, cough, changes in urination. ER labs were significant for elevated LFTs with AST> ALT. He currently is on librium q6.    Currently patient feels better compared to yesterday evening. Nursing reports no seizure like activity overnight.     VITAL SIGNS:  T(F): 98.3 (07-14-17 @ 05:29)  HR: 75 (07-14-17 @ 05:29)  BP: 124/81 (07-14-17 @ 05:29)  RR: 18 (07-14-17 @ 05:29)  SpO2: 97% (07-14-17 @ 05:29)  Wt(kg): --    PHYSICAL EXAM:      Constitutional: NAD  HEENT: PERRLA, EOMI, Normal Hearing, MMM, tongue appears to have been bitten and is bloody.   Neck: No LAD, No JVD  Back: Normal spine flexure, No CVA tenderness  Respiratory: CTAB  Cardiovascular: S1 and S2, RRR, no M/G/R  Gastrointestinal: BS+, soft, NT/ND, liver span by percussion appears grossly normal.   Extremities: No peripheral edema  Vascular: 2+ peripheral pulses  Neurological: A/O x 3. patient has noticeable tremor in outstretched arms w/ accompanying fasciculations of the tongue. Pt appears lethargic this morning.   Musculoskeletal: 5/5 strength b/l upper and lower extremities  Skin: No rashes        MEDICATIONS  (STANDING):  chlordiazePOXIDE 50 milliGRAM(s) Oral every 6 hours  thiamine 100 milliGRAM(s) Oral daily  multivitamin 1 Tablet(s) Oral daily  folic acid 1 milliGRAM(s) Oral daily  heparin  Injectable 5000 Unit(s) SubCutaneous every 8 hours  magnesium sulfate  IVPB 2 Gram(s) IV Intermittent once  potassium chloride    Tablet ER 40 milliEquivalent(s) Oral once    MEDICATIONS  (PRN):  LORazepam   Injectable 2 milliGRAM(s) IntraMuscular every 4 hours PRN Anxiety/agitation      Allergies    No Known Allergies    Intolerances        LABS:                        14.0   7.0   )-----------( 144      ( 14 Jul 2017 06:58 )             40.0     07-14    136  |  98  |  5<L>  ----------------------------<  103<H>  3.4<L>   |  25  |  0.60    Ca    8.6      14 Jul 2017 06:58  Phos  3.0     07-14  Mg     1.8     07-14    TPro  7.1  /  Alb  4.1  /  TBili  1.5<H>  /  DBili  x   /  AST  143<H>  /  ALT  103<H>  /  AlkPhos  60  07-14          RADIOLOGY & ADDITIONAL TESTS:
INTERVAL HPI/OVERNIGHT EVENTS:    Pt is a 48 yo male w hx EtOH use disorder who presented with tremor, AMS, and likely seizure being tx for alcohol withdrawal.     Pt's wife reports no acute events overnight. Pt is resting comfortably in bed, is easily arousable. He states that his tremor is improved. No complaints of chest pain, SOB, NV, AVT hallucinations, diaphoresis.     VITAL SIGNS:  T(F): 98.4 (07-16-17 @ 21:29)  HR: 65 (07-16-17 @ 21:29)  BP: 123/82 (07-16-17 @ 21:29)  RR: 20 (07-16-17 @ 21:29)  SpO2: 98% (07-16-17 @ 21:29)  Wt(kg): --    PHYSICAL EXAM:      Constitutional: NAD  HEENT: PERRLA, EOMI, Normal Hearing, MMM  Neck: No LAD, No JVD  Back: Normal spine flexure, No CVA tenderness  Respiratory: CTAB  Cardiovascular: S1 and S2, RRR, no M/G/R  Gastrointestinal: BS+, soft, NT/ND  Extremities: No peripheral edema  Vascular: 2+ peripheral pulses  Neurological: A/O x 3, no focal deficits. fine tremor in outstretched hands, no tongue fasciculations.   Psychiatric: Normal mood, normal affect  Musculoskeletal: 5/5 strength b/l upper and lower extremities  Skin: No rashes        MEDICATIONS  (STANDING):  thiamine 100 milliGRAM(s) Oral daily  multivitamin 1 Tablet(s) Oral daily  folic acid 1 milliGRAM(s) Oral daily  heparin  Injectable 5000 Unit(s) SubCutaneous every 8 hours  chlordiazePOXIDE 50 milliGRAM(s) Oral every 6 hours    MEDICATIONS  (PRN):  LORazepam   Injectable 2 milliGRAM(s) IV Push every 4 hours PRN Agitation      Allergies    No Known Allergies    Intolerances        LABS:                        14.3   6.2   )-----------( 160      ( 16 Jul 2017 06:56 )             41.5     07-16    136  |  95<L>  |  7   ----------------------------<  118<H>  3.5   |  25  |  0.70    Ca    9.5      16 Jul 2017 06:56  Mg     1.9     07-16    TPro  8.1  /  Alb  4.3  /  TBili  1.4<H>  /  DBili  x   /  AST  153<H>  /  ALT  118<H>  /  AlkPhos  71  07-15          RADIOLOGY & ADDITIONAL TESTS:
OVERNIGHT EVENTS: 10pm CIWA 12, 1mg IV ativan given. Called to bedside at 1am due to patient agitation. Patient was repeatedly getting out of bed, was disoriented to place and situation, and was restless. 2mg IV ativan given at 1am, patient continued to have increasing itchiness, described feeling bugs crawling on his skin, and displayed behaviors consistent with visual hallucination (pinching at bedsheets on the bottom of his bed, looking repeatedly at the curtain and tying it into knots). Patient became more tachycardic to 105-110s. 2mg IV ativan given at 2am. Patient continued to remain agitated and itchy, continued to try to climb out of bed. Patient given 3mg IM ativan (due to loss of IV access) at 3:15am, appeared to be more comfortable, slept approximately 20 minutes, then became more restless at 3:40am. Vitals obtained at 3:40 included HR in 80s, blood pressures 130s/80s, O2 sat 100% RA, RR 15-18 counted over 20 seconds. Reassessed q30 minutes, 5:00am HR 67, O2 sat 100% RA, RR 14 (counted 30 seconds).    SUBJECTIVE / INTERVAL HPI: Patient seen and examined at bedside. Pt sedated. Per wife, pt has been sleeping comfortably since 3:30AM.     VITAL SIGNS:  Vital Signs Last 24 Hrs  T(C): 36.7 (16 Jul 2017 08:20), Max: 36.9 (15 Jul 2017 17:29)  T(F): 98 (16 Jul 2017 08:20), Max: 98.5 (15 Jul 2017 17:29)  HR: 82 (16 Jul 2017 08:20) (73 - 101)  BP: 112/66 (16 Jul 2017 08:20) (112/66 - 138/95)  BP(mean): --  RR: 19 (16 Jul 2017 08:20) (18 - 21)  SpO2: 98% (16 Jul 2017 08:20) (97% - 99%)    PHYSICAL EXAM:  GEN: NAD, sleeping  HEENT: PERRLA, EOMI, Normal Hearing, MMM, tongue appears to have been bitten.  Neck: No LAD, No JVD  Respiratory: CTAB  Cardiovascular: S1 and S2, RRR, no M/G/R  Gastrointestinal: BS+, soft, NT/ND  Extremities: No peripheral edema, 2+ peripheral pulses  Neurological: Pt appears lethargic this morning. Difficult to wake up.  Skin: No rashes    MEDICATIONS:  MEDICATIONS  (STANDING):  thiamine 100 milliGRAM(s) Oral daily  multivitamin 1 Tablet(s) Oral daily  folic acid 1 milliGRAM(s) Oral daily  heparin  Injectable 5000 Unit(s) SubCutaneous every 8 hours  chlordiazePOXIDE 50 milliGRAM(s) Oral every 8 hours  magnesium sulfate  IVPB 1 Gram(s) IV Intermittent once    MEDICATIONS  (PRN):      ALLERGIES:  Allergies    No Known Allergies    Intolerances        LABS:                        14.3   6.2   )-----------( 160      ( 16 Jul 2017 06:56 )             41.5     07-16    136  |  95<L>  |  7   ----------------------------<  118<H>  3.5   |  25  |  0.70    Ca    9.5      16 Jul 2017 06:56  Mg     1.9     07-16    TPro  8.1  /  Alb  4.3  /  TBili  1.4<H>  /  DBili  x   /  AST  153<H>  /  ALT  118<H>  /  AlkPhos  71  07-15        CAPILLARY BLOOD GLUCOSE          RADIOLOGY & ADDITIONAL TESTS: Reviewed.

## 2017-07-17 NOTE — PROGRESS NOTE ADULT - PROBLEM SELECTOR PLAN 4
F: patient does not IVF at this time  E: replete lytes PRN for K < 4 and Mg < 2, repleted K and Mg today will re eval in AM  N: regular diet

## 2017-07-17 NOTE — PROGRESS NOTE ADULT - PROBLEM SELECTOR PLAN 1
Patient drinks 15 beers/day, last drink 7/12 at 10pm. Pt had LOC and tongue bleeding, PT likely had seizure. Currently patient has fine tremor, improved since Friday.  CIWA 4.   - Librium 50 mg Q 6 to Q8   - Continue Ativan 2mg Q4hrs prn CIWA score >8  - EEG to evaluate alcohol withdrawal seizures  - Continue multivitamins, thiamine, and folic acid  - B12/folic acid levels  - CIWA Q4hrs. Patient drinks 15 beers/day, last drink 7/12 at 10pm. Pt had LOC and tongue bleeding, PT likely had seizure. Currently patient has fine tremor, improved since Friday.  CIWA 4.   - Librium 50 mg Q 6   - Continue Ativan 2mg Q4hrs prn CIWA score >8  - EEG to evaluate alcohol withdrawal seizures  - Continue multivitamins, thiamine, and folic acid  - B12/folic acid levels  - CIWA Q4hrs. Patient drinks 15 beers/day, last drink 7/12 at 10pm. Pt had LOC and tongue bleeding, PT likely had seizure. Currently patient has fine tremor, improved since Friday.  CIWA 4.   - Decrease Librium 50 mg Q 6 to Q 8   - Continue Ativan 2mg Q4hrs prn CIWA score >8  - EEG negative   - Continue multivitamins, thiamine, and folic acid  - B12/folic acid levels normal  - CIWA Q4hrs. Patient drinks 15 beers/day, last drink 7/12 at 10pm. Pt had LOC and tongue bleeding, PT likely had seizure. Currently patient has fine tremor, improved since Friday.  CIWA 4.   - Decrease Librium 50 mg Q 6 to 25 mg Q 8  -PRN haldol for PM agitation   - Continue Ativan 2mg Q4hrs prn CIWA score >8  - EEG negative   - Continue multivitamins, thiamine, and folic acid  - B12/folic acid levels normal  - CIWA Q4hrs.

## 2017-07-17 NOTE — PROGRESS NOTE ADULT - PROBLEM SELECTOR PLAN 2
Patient with transaminitis, with AST > ALT. Likely due to EtOH abuse.  - Continue to trend.  - hepatitis panels negative   - Abdominal US showed hepatic steatosis

## 2017-07-17 NOTE — PROGRESS NOTE ADULT - PROBLEM SELECTOR PROBLEM 1
Alcohol withdrawal syndrome, with unspecified complication

## 2017-07-17 NOTE — PROGRESS NOTE ADULT - ATTENDING COMMENTS
Pt seen and examined by me at bedside earlier in AM. Agree with housestaff's exam/a/p as noted above with additions, overnight events noted.  line used [id number], collateral information obtained from wife as well. pt was delirious overnight. VSS, exam as above.   a/p:  1. ETOH w/d: agree with increase librium 50mg q8hrs, ativan 2mg IV prn, if continue to require frequent ativan; 7lach consult for close monitoring. EEG negative for seizure.  2. abnormal LFT: RUQ u/s noted: hepatic steatosis,   rest of a/p as above.  dispo: pending clinical improvement,
Pt seen and examined by me at bedside earlier in AM. Agree with housestaff's exam/a/p as noted above with additions,   collateral information obtained from wife, translated through resident, pt seem more diorientated after receving BDZ. ? delrium caused by bdz, taper librium 25mg po q8hrs, if pt delirious after receiving BDZ, d/c bdz. plan d/w HS.
Dispo: anticipate dc tomorrow if cont clinical improve.

## 2017-07-17 NOTE — PROGRESS NOTE ADULT - ASSESSMENT
48yo poor historian, Yakut speaking male with PMH of EtOH abuse - currently drinking approx 10 drinks/day - with a history of seizures who presented to the ER after probable seizure at work. Patient had LOC, tongue biting and had resting tremors at work. Currently patient remains has fine tremor in outstretched hands. Pt reports formication. CIWA 4.

## 2017-07-18 VITALS
RESPIRATION RATE: 18 BRPM | TEMPERATURE: 98 F | DIASTOLIC BLOOD PRESSURE: 77 MMHG | SYSTOLIC BLOOD PRESSURE: 132 MMHG | OXYGEN SATURATION: 97 % | HEART RATE: 84 BPM

## 2017-07-18 LAB
ALBUMIN SERPL ELPH-MCNC: 4.1 G/DL — SIGNIFICANT CHANGE UP (ref 3.3–5)
ALP SERPL-CCNC: 72 U/L — SIGNIFICANT CHANGE UP (ref 40–120)
ALT FLD-CCNC: 179 U/L — HIGH (ref 10–45)
ANION GAP SERPL CALC-SCNC: 12 MMOL/L — SIGNIFICANT CHANGE UP (ref 5–17)
AST SERPL-CCNC: 189 U/L — HIGH (ref 10–40)
BILIRUB SERPL-MCNC: 0.8 MG/DL — SIGNIFICANT CHANGE UP (ref 0.2–1.2)
BUN SERPL-MCNC: 7 MG/DL — SIGNIFICANT CHANGE UP (ref 7–23)
CALCIUM SERPL-MCNC: 9.4 MG/DL — SIGNIFICANT CHANGE UP (ref 8.4–10.5)
CHLORIDE SERPL-SCNC: 98 MMOL/L — SIGNIFICANT CHANGE UP (ref 96–108)
CO2 SERPL-SCNC: 28 MMOL/L — SIGNIFICANT CHANGE UP (ref 22–31)
CREAT SERPL-MCNC: 0.8 MG/DL — SIGNIFICANT CHANGE UP (ref 0.5–1.3)
GLUCOSE SERPL-MCNC: 106 MG/DL — HIGH (ref 70–99)
HCT VFR BLD CALC: 41 % — SIGNIFICANT CHANGE UP (ref 39–50)
HGB BLD-MCNC: 14.3 G/DL — SIGNIFICANT CHANGE UP (ref 13–17)
MAGNESIUM SERPL-MCNC: 2.1 MG/DL — SIGNIFICANT CHANGE UP (ref 1.6–2.6)
MCHC RBC-ENTMCNC: 32.8 PG — SIGNIFICANT CHANGE UP (ref 27–34)
MCHC RBC-ENTMCNC: 34.9 G/DL — SIGNIFICANT CHANGE UP (ref 32–36)
MCV RBC AUTO: 94 FL — SIGNIFICANT CHANGE UP (ref 80–100)
PLATELET # BLD AUTO: 203 K/UL — SIGNIFICANT CHANGE UP (ref 150–400)
POTASSIUM SERPL-MCNC: 4.3 MMOL/L — SIGNIFICANT CHANGE UP (ref 3.5–5.3)
POTASSIUM SERPL-SCNC: 4.3 MMOL/L — SIGNIFICANT CHANGE UP (ref 3.5–5.3)
PROT SERPL-MCNC: 7.5 G/DL — SIGNIFICANT CHANGE UP (ref 6–8.3)
RBC # BLD: 4.36 M/UL — SIGNIFICANT CHANGE UP (ref 4.2–5.8)
RBC # FLD: 14 % — SIGNIFICANT CHANGE UP (ref 10.3–16.9)
SODIUM SERPL-SCNC: 138 MMOL/L — SIGNIFICANT CHANGE UP (ref 135–145)
WBC # BLD: 5.9 K/UL — SIGNIFICANT CHANGE UP (ref 3.8–10.5)
WBC # FLD AUTO: 5.9 K/UL — SIGNIFICANT CHANGE UP (ref 3.8–10.5)

## 2017-07-18 PROCEDURE — 96374 THER/PROPH/DIAG INJ IV PUSH: CPT

## 2017-07-18 PROCEDURE — 93005 ELECTROCARDIOGRAM TRACING: CPT

## 2017-07-18 PROCEDURE — 85027 COMPLETE CBC AUTOMATED: CPT

## 2017-07-18 PROCEDURE — 83690 ASSAY OF LIPASE: CPT

## 2017-07-18 PROCEDURE — 82746 ASSAY OF FOLIC ACID SERUM: CPT

## 2017-07-18 PROCEDURE — 84100 ASSAY OF PHOSPHORUS: CPT

## 2017-07-18 PROCEDURE — 36415 COLL VENOUS BLD VENIPUNCTURE: CPT

## 2017-07-18 PROCEDURE — 80053 COMPREHEN METABOLIC PANEL: CPT

## 2017-07-18 PROCEDURE — 83735 ASSAY OF MAGNESIUM: CPT

## 2017-07-18 PROCEDURE — 99285 EMERGENCY DEPT VISIT HI MDM: CPT | Mod: 25

## 2017-07-18 PROCEDURE — 80048 BASIC METABOLIC PNL TOTAL CA: CPT

## 2017-07-18 PROCEDURE — 76705 ECHO EXAM OF ABDOMEN: CPT

## 2017-07-18 PROCEDURE — 85025 COMPLETE CBC W/AUTO DIFF WBC: CPT

## 2017-07-18 PROCEDURE — 82607 VITAMIN B-12: CPT

## 2017-07-18 PROCEDURE — 80074 ACUTE HEPATITIS PANEL: CPT

## 2017-07-18 PROCEDURE — 99239 HOSP IP/OBS DSCHRG MGMT >30: CPT

## 2017-07-18 PROCEDURE — 80307 DRUG TEST PRSMV CHEM ANLYZR: CPT

## 2017-07-18 PROCEDURE — 96372 THER/PROPH/DIAG INJ SC/IM: CPT | Mod: XU

## 2017-07-18 PROCEDURE — 95819 EEG AWAKE AND ASLEEP: CPT

## 2017-07-18 RX ORDER — FOLIC ACID 0.8 MG
1 TABLET ORAL
Qty: 30 | Refills: 0 | OUTPATIENT
Start: 2017-07-18 | End: 2017-08-17

## 2017-07-18 RX ORDER — THIAMINE MONONITRATE (VIT B1) 100 MG
1 TABLET ORAL
Qty: 30 | Refills: 0 | OUTPATIENT
Start: 2017-07-18 | End: 2017-08-17

## 2017-07-18 RX ADMIN — Medication 25 MILLIGRAM(S): at 07:10

## 2017-07-18 RX ADMIN — Medication 100 MILLIGRAM(S): at 11:50

## 2017-07-18 RX ADMIN — Medication 1 MILLIGRAM(S): at 11:50

## 2017-07-18 RX ADMIN — HEPARIN SODIUM 5000 UNIT(S): 5000 INJECTION INTRAVENOUS; SUBCUTANEOUS at 07:10

## 2017-07-18 RX ADMIN — Medication 1 TABLET(S): at 11:50

## 2017-07-20 DIAGNOSIS — R25.1 TREMOR, UNSPECIFIED: ICD-10-CM

## 2017-07-20 DIAGNOSIS — F17.210 NICOTINE DEPENDENCE, CIGARETTES, UNCOMPLICATED: ICD-10-CM

## 2017-07-20 DIAGNOSIS — R41.0 DISORIENTATION, UNSPECIFIED: ICD-10-CM

## 2017-07-20 DIAGNOSIS — D69.6 THROMBOCYTOPENIA, UNSPECIFIED: ICD-10-CM

## 2017-07-20 DIAGNOSIS — R45.1 RESTLESSNESS AND AGITATION: ICD-10-CM

## 2017-07-20 DIAGNOSIS — E87.1 HYPO-OSMOLALITY AND HYPONATREMIA: ICD-10-CM

## 2017-07-20 DIAGNOSIS — Z81.1 FAMILY HISTORY OF ALCOHOL ABUSE AND DEPENDENCE: ICD-10-CM

## 2017-07-20 DIAGNOSIS — R94.5 ABNORMAL RESULTS OF LIVER FUNCTION STUDIES: ICD-10-CM
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
## 2017-07-20 DIAGNOSIS — F10.239 ALCOHOL DEPENDENCE WITH WITHDRAWAL, UNSPECIFIED: ICD-10-CM

## 2018-01-31 ENCOUNTER — EMERGENCY (EMERGENCY)
Facility: HOSPITAL | Age: 48
LOS: 1 days | Discharge: HOME | End: 2018-01-31
Attending: EMERGENCY MEDICINE | Admitting: EMERGENCY MEDICINE
Payer: MEDICAID

## 2018-01-31 VITALS
TEMPERATURE: 99 F | HEART RATE: 77 BPM | SYSTOLIC BLOOD PRESSURE: 119 MMHG | OXYGEN SATURATION: 98 % | DIASTOLIC BLOOD PRESSURE: 79 MMHG | RESPIRATION RATE: 18 BRPM | WEIGHT: 176.59 LBS

## 2018-01-31 DIAGNOSIS — L29.9 PRURITUS, UNSPECIFIED: ICD-10-CM

## 2018-01-31 DIAGNOSIS — Z79.899 OTHER LONG TERM (CURRENT) DRUG THERAPY: ICD-10-CM

## 2018-01-31 LAB
ALBUMIN SERPL ELPH-MCNC: 4.6 G/DL — SIGNIFICANT CHANGE UP (ref 3.3–5)
ALP SERPL-CCNC: 86 U/L — SIGNIFICANT CHANGE UP (ref 40–120)
ALT FLD-CCNC: 23 U/L — SIGNIFICANT CHANGE UP (ref 10–45)
ANION GAP SERPL CALC-SCNC: 11 MMOL/L — SIGNIFICANT CHANGE UP (ref 5–17)
AST SERPL-CCNC: 26 U/L — SIGNIFICANT CHANGE UP (ref 10–40)
BASOPHILS NFR BLD AUTO: 1 % — SIGNIFICANT CHANGE UP (ref 0–2)
BILIRUB DIRECT SERPL-MCNC: <0.2 MG/DL — SIGNIFICANT CHANGE UP (ref 0–0.2)
BILIRUB SERPL-MCNC: 0.3 MG/DL — SIGNIFICANT CHANGE UP (ref 0.2–1.2)
BUN SERPL-MCNC: 11 MG/DL — SIGNIFICANT CHANGE UP (ref 7–23)
CALCIUM SERPL-MCNC: 9.5 MG/DL — SIGNIFICANT CHANGE UP (ref 8.4–10.5)
CHLORIDE SERPL-SCNC: 99 MMOL/L — SIGNIFICANT CHANGE UP (ref 96–108)
CO2 SERPL-SCNC: 28 MMOL/L — SIGNIFICANT CHANGE UP (ref 22–31)
CREAT SERPL-MCNC: 0.68 MG/DL — SIGNIFICANT CHANGE UP (ref 0.5–1.3)
EOSINOPHIL NFR BLD AUTO: 5.7 % — SIGNIFICANT CHANGE UP (ref 0–6)
GLUCOSE SERPL-MCNC: 109 MG/DL — HIGH (ref 70–99)
HCT VFR BLD CALC: 42.3 % — SIGNIFICANT CHANGE UP (ref 39–50)
HGB BLD-MCNC: 15 G/DL — SIGNIFICANT CHANGE UP (ref 13–17)
LYMPHOCYTES # BLD AUTO: 27.1 % — SIGNIFICANT CHANGE UP (ref 13–44)
MCHC RBC-ENTMCNC: 30.9 PG — SIGNIFICANT CHANGE UP (ref 27–34)
MCHC RBC-ENTMCNC: 35.5 G/DL — SIGNIFICANT CHANGE UP (ref 32–36)
MCV RBC AUTO: 87.2 FL — SIGNIFICANT CHANGE UP (ref 80–100)
MONOCYTES NFR BLD AUTO: 5.7 % — SIGNIFICANT CHANGE UP (ref 2–14)
NEUTROPHILS NFR BLD AUTO: 60.5 % — SIGNIFICANT CHANGE UP (ref 43–77)
PLATELET # BLD AUTO: 261 K/UL — SIGNIFICANT CHANGE UP (ref 150–400)
POTASSIUM SERPL-MCNC: 4.2 MMOL/L — SIGNIFICANT CHANGE UP (ref 3.5–5.3)
POTASSIUM SERPL-SCNC: 4.2 MMOL/L — SIGNIFICANT CHANGE UP (ref 3.5–5.3)
PROT SERPL-MCNC: 8.1 G/DL — SIGNIFICANT CHANGE UP (ref 6–8.3)
RBC # BLD: 4.85 M/UL — SIGNIFICANT CHANGE UP (ref 4.2–5.8)
RBC # FLD: 13.8 % — SIGNIFICANT CHANGE UP (ref 10.3–16.9)
SODIUM SERPL-SCNC: 138 MMOL/L — SIGNIFICANT CHANGE UP (ref 135–145)
WBC # BLD: 7.7 K/UL — SIGNIFICANT CHANGE UP (ref 3.8–10.5)
WBC # FLD AUTO: 7.7 K/UL — SIGNIFICANT CHANGE UP (ref 3.8–10.5)

## 2018-01-31 PROCEDURE — 99284 EMERGENCY DEPT VISIT MOD MDM: CPT

## 2018-01-31 PROCEDURE — 80053 COMPREHEN METABOLIC PANEL: CPT

## 2018-01-31 PROCEDURE — 36415 COLL VENOUS BLD VENIPUNCTURE: CPT

## 2018-01-31 PROCEDURE — 82248 BILIRUBIN DIRECT: CPT

## 2018-01-31 PROCEDURE — 85025 COMPLETE CBC W/AUTO DIFF WBC: CPT

## 2018-01-31 PROCEDURE — 99283 EMERGENCY DEPT VISIT LOW MDM: CPT

## 2018-01-31 RX ORDER — LORATADINE 10 MG/1
10 TABLET ORAL ONCE
Qty: 0 | Refills: 0 | Status: COMPLETED | OUTPATIENT
Start: 2018-01-31 | End: 2018-01-31

## 2018-01-31 RX ADMIN — LORATADINE 10 MILLIGRAM(S): 10 TABLET ORAL at 15:39

## 2018-01-31 NOTE — ED PROVIDER NOTE - MEDICAL DECISION MAKING DETAILS
47 yo M w/ PMHx of alcohol abuse p/w 3 months of progressively worsening itching.  Abd US in July w/ hepatic steatosis.  Pruritus previously responsive to "allergy pills", will try cetrizine today. Repeat labs to evaluate for eosinophilia, worsening liver disease. 49 yo M w/ PMHx of alcohol abuse p/w 3 months of progressively worsening itching.  Abd US in July w/ hepatic steatosis.  Pruritus previously responsive to "allergy pills", will try cetrizine today. Repeat labs to evaluate for eosinophilia, worsening liver disease. Will suggest to continue w/ cetrizine and emollient based lotion. 49 yo M w/ PMHx of alcohol abuse p/w 3 months of progressively worsening itching.  Abd US in July w/ hepatic steatosis.  Pruritus previously responsive to "allergy pills", will try cetrizine today. Repeat labs negative for worsening liver disease. Will suggest to continue w/ cetrizine and emollient based lotion.  Will also refer for PMD. Pt amenable to plan.

## 2018-01-31 NOTE — ED PROVIDER NOTE - ENMT NEGATIVE STATEMENT, MLM
Ears: no ear pain and no hearing problems.Nose: no nasal congestion and no nasal drainage.Mouth/Throat: no dysphagia, no hoarseness and no throat pain.Neck: no lumps, no pain, no stiffness and no swollen glands. Ears: no ear pain and no hearing problems.Nose: no nasal congestion and no nasal drainage.Mouth/Throat: no throat pain.Neck: no stiffness and no swollen glands.

## 2018-01-31 NOTE — ED PROVIDER NOTE - OBJECTIVE STATEMENT
46 yo M w/ PMHx of ETOH abuse (last drink 6 mos ago) presents w/ generalized pruritus x 3 months.  Pt reports thinks that his symptoms are related to cessation of alcohol and have progressively worsened.  He was given "an allergy pill" for this issue a few months ago which caused relief of his symptoms.  His symptoms are also improved w/ warm showers.  The ithching initially started on his face and then spread to his back, shoulders and legs. He lives in an apartment.  He denies other contacts w/ similar symptoms.  He denies seeing bed bugs, or other insects.  He denies taking other medications. ROS negative for fevers, chills, nausea, vomiting, eye discomfort/discharge, rhinorrhea, nasal congestion, cough, abdominal pain, diarrhea, LE edema. 46 yo M w/ PMHx of ETOH abuse (last drink 6 mos ago) presents w/ generalized pruritus x 3 months.  Pt reports thinks that his symptoms are related to cessation of alcohol and have progressively worsened.  He was given "an allergy pill" for this issue a few months ago which caused relief of his symptoms.  His symptoms are also improved w/ warm showers.  The ithching initially started on his face and then spread to his back, shoulders and legs. He lives in an apartment.  He denies other contacts w/ similar symptoms.  He denies seeing bed bugs, or other insects.   He denies taking other medications. ROS negative for fevers, chills, nausea, vomiting, eye discomfort/discharge, rhinorrhea, nasal congestion, cough, abdominal pain, diarrhea, LE edema. 46 yo M w/ PMHx of ETOH abuse (last drink 6 mos ago) presents w/ generalized pruritus x 3 months.  Pt reports thinks that his symptoms are related to cessation of alcohol and have progressively worsened.  He was given "an allergy pill" for this issue a few months ago which caused relief of his symptoms.  His symptoms are also improved w/ warm showers.  The itching initially started on his face and then spread to his back, shoulders and legs. He lives in an apartment.  He denies other contacts w/ similar symptoms.  He denies seeing bed bugs, or other insects.   He denies taking other medications. ROS negative for fevers, chills, nausea, vomiting, eye discomfort/discharge, rhinorrhea, nasal congestion, cough, abdominal pain, diarrhea, LE edema.

## 2018-01-31 NOTE — ED PROVIDER NOTE - ATTENDING CONTRIBUTION TO CARE
itching with mild erythematous/ blotchy rash.  no burrows, vesicles, purpura.  better with antihistamine in past.  labs without liver/renal dysfunction.  will restart antihistamine and f/u outpatient

## 2018-01-31 NOTE — ED ADULT NURSE NOTE - OBJECTIVE STATEMENT
Pt presents complaining of itching throughout body. Denies fever, no cp, no sob, no n/v, no changes to bowels, no urinary complaints

## 2018-01-31 NOTE — ED ADULT NURSE NOTE - CHPI ED SYMPTOMS NEG
no tingling/no numbness/no pain/no chills/no decreased eating/drinking/no vomiting/no nausea/no dizziness/no weakness/no fever

## 2018-01-31 NOTE — ED PROVIDER NOTE - SKIN, MLM
Skin normal color for race, warm, dry and intact. Blanching maculopapular rash over face and back w/ linear excoriation marks.  Punctate .5 to 1 cm papules over upper arms b/l.

## 2023-05-10 NOTE — ED PROVIDER NOTE - NS ED MD DISPO ADMIT [facility] PALLIATIVE CARE
PHYSICAL EXAM:  GENERAL: NAD, lying in bed comfortably  HEAD:  Atraumatic, Normocephalic  EYES: EOMI, PERRLA, conjunctiva and sclera clear  ENT: No erythema/pallor/petechiae/lesions; TMs clear b/l  NECK: Supple, No JVD  LUNG: CTA b/l; no r/r/w  HEART: RRR, +S1/S2; No m/r/g  ABDOMEN: soft, NT/ND; BS audible   EXTREMITIES:  2+ Peripheral Pulses, brisk cap refill. No clubbing, cyanosis, or edema  NERVOUS SYSTEM:  AAOx3, speech clear. No sensory/motor deficits   MSK: FROM all 4 extremities, full and equal strength  SKIN: No rashes or lesions NONE
